# Patient Record
Sex: MALE | Race: WHITE | NOT HISPANIC OR LATINO | Employment: FULL TIME | URBAN - METROPOLITAN AREA
[De-identification: names, ages, dates, MRNs, and addresses within clinical notes are randomized per-mention and may not be internally consistent; named-entity substitution may affect disease eponyms.]

---

## 2017-03-03 ENCOUNTER — HOSPITAL ENCOUNTER (OUTPATIENT)
Dept: RADIOLOGY | Facility: MEDICAL CENTER | Age: 62
End: 2017-03-03
Attending: INTERNAL MEDICINE
Payer: COMMERCIAL

## 2017-03-03 ENCOUNTER — HOSPITAL ENCOUNTER (OUTPATIENT)
Dept: CARDIOLOGY | Facility: MEDICAL CENTER | Age: 62
End: 2017-03-03
Attending: INTERNAL MEDICINE | Admitting: RADIOLOGY
Payer: COMMERCIAL

## 2017-03-03 DIAGNOSIS — I51.7 LVH (LEFT VENTRICULAR HYPERTROPHY): ICD-10-CM

## 2017-03-03 DIAGNOSIS — Z79.01 CHRONIC ANTICOAGULATION: ICD-10-CM

## 2017-03-03 DIAGNOSIS — I26.02 ACUTE SADDLE PULMONARY EMBOLISM WITH ACUTE COR PULMONALE (HCC): ICD-10-CM

## 2017-03-03 DIAGNOSIS — I10 ESSENTIAL HYPERTENSION: ICD-10-CM

## 2017-03-03 DIAGNOSIS — I48.0 PAF (PAROXYSMAL ATRIAL FIBRILLATION) (HCC): ICD-10-CM

## 2017-03-03 PROCEDURE — 71020 DX-CHEST-2 VIEWS: CPT

## 2017-03-03 PROCEDURE — 93306 TTE W/DOPPLER COMPLETE: CPT | Mod: 26 | Performed by: INTERNAL MEDICINE

## 2017-03-03 PROCEDURE — 93306 TTE W/DOPPLER COMPLETE: CPT

## 2017-03-03 PROCEDURE — A9567 TECHNETIUM TC-99M AEROSOL: HCPCS

## 2017-03-06 LAB
LV EJECT FRACT  99904: 60
LV EJECT FRACT MOD 2C 99903: 65.45
LV EJECT FRACT MOD 4C 99902: 51.29
LV EJECT FRACT MOD BP 99901: 58.36

## 2017-03-10 ENCOUNTER — OFFICE VISIT (OUTPATIENT)
Dept: CARDIOLOGY | Facility: CLINIC | Age: 62
End: 2017-03-10
Payer: COMMERCIAL

## 2017-03-10 VITALS
HEART RATE: 74 BPM | SYSTOLIC BLOOD PRESSURE: 130 MMHG | HEIGHT: 76 IN | WEIGHT: 275 LBS | BODY MASS INDEX: 33.49 KG/M2 | DIASTOLIC BLOOD PRESSURE: 70 MMHG

## 2017-03-10 DIAGNOSIS — I26.02 ACUTE SADDLE PULMONARY EMBOLISM WITH ACUTE COR PULMONALE (HCC): ICD-10-CM

## 2017-03-10 DIAGNOSIS — I48.0 PAF (PAROXYSMAL ATRIAL FIBRILLATION) (HCC): ICD-10-CM

## 2017-03-10 DIAGNOSIS — I51.7 LVH (LEFT VENTRICULAR HYPERTROPHY): ICD-10-CM

## 2017-03-10 DIAGNOSIS — G47.34 NOCTURNAL HYPOXEMIA: ICD-10-CM

## 2017-03-10 DIAGNOSIS — L40.50 PSORIATIC ARTHRITIS (HCC): ICD-10-CM

## 2017-03-10 DIAGNOSIS — I10 ESSENTIAL HYPERTENSION: ICD-10-CM

## 2017-03-10 DIAGNOSIS — Z79.01 CHRONIC ANTICOAGULATION: ICD-10-CM

## 2017-03-10 PROCEDURE — 99214 OFFICE O/P EST MOD 30 MIN: CPT | Performed by: INTERNAL MEDICINE

## 2017-03-10 ASSESSMENT — ENCOUNTER SYMPTOMS
CARDIOVASCULAR NEGATIVE: 1
WHEEZING: 0
HEMOPTYSIS: 0
COUGH: 0
SHORTNESS OF BREATH: 0
ORTHOPNEA: 0
FEVER: 0
SPUTUM PRODUCTION: 0
BRUISES/BLEEDS EASILY: 0
PALPITATIONS: 0
CLAUDICATION: 0
GASTROINTESTINAL NEGATIVE: 1
STRIDOR: 0
LOSS OF CONSCIOUSNESS: 0
EYES NEGATIVE: 1
RESPIRATORY NEGATIVE: 1
CHILLS: 0
NEUROLOGICAL NEGATIVE: 1
DIZZINESS: 0
PND: 0
WEAKNESS: 0
SORE THROAT: 0
MUSCULOSKELETAL NEGATIVE: 1
CONSTITUTIONAL NEGATIVE: 1

## 2017-03-10 NOTE — PROGRESS NOTES
Subjective:   Octavio Jacques is a 62 y.o. male who presents today for hi symnptomatic PVCs and history of PEs.  He is still having a lot of fatigue.  His V/Q scan was normal, his echo was normal.  His PVCs still bother him,    Past Medical History   Diagnosis Date   • Arrhythmia      hx of palpations   • Snoring    • Asthma      uses rescue inhaler   • Indigestion      hx of stomachache, heart burn    • Other specified disorder of intestines      lower GI problems   • Arthritis    • Anesthesia      nausea sometimes   • History of sinus surgery      x2   • Hypertension      Past Surgical History   Procedure Laterality Date   • Egd esophagus with endoscopic us     • Fusion       left sacriloiac joint   • Cholecystectomy     • Gastroscopy-endo  7/7/2014     Performed by Dharmesh Chau M.D. at SURGERY [facility]     History reviewed. No pertinent family history.  History   Smoking status   • Never Smoker    Smokeless tobacco   • Not on file     Allergies   Allergen Reactions   • Codeine    • Lactose      GI disturbance   • Methotrexate      Outpatient Encounter Prescriptions as of 3/10/2017   Medication Sig Dispense Refill   • apixaban (ELIQUIS) 2.5mg Tab Take 1 Tab by mouth 2 Times a Day. 60 Tab 11   • diltiazem CD (CARDIZEM CD) 120 MG CAPSULE SR 24 HR Take 1 Cap by mouth every day. 30 Cap 0   • gabapentin (NEURONTIN) 300 MG Cap Take 900 mg by mouth 3 times a day.     • losartan (COZAAR) 100 MG Tab Take 100 mg by mouth every day.     • famotidine (PEPCID) 40 MG Tab Take 40 mg by mouth 2 times a day.     • montelukast (SINGULAIR) 10 MG Tab Take 10 mg by mouth every bedtime.     • tiotropium (SPIRIVA) 18 MCG Cap Inhale 18 mcg by mouth every day.     • albuterol (VENTOLIN OR PROVENTIL) 108 (90 BASE) MCG/ACT Aero Soln inhalation aerosol Inhale 2 Puffs by mouth every four hours as needed for Shortness of Breath.     • Mometasone Furo-Formoterol Fum 200-5 MCG/ACT Aerosol Inhale 2 Puffs by mouth 2 Times a Day.     •  cyclosporin (RESTASIS) 0.05 % ophthalmic emulsion Place 1 Drop in both eyes 2 times a day.     • Oxycodone-Acetaminophen (PERCOCET-10)  MG Tab Take 1-2 Tabs by mouth every 8 hours as needed for Severe Pain.     • hydrocodone-acetaminophen (NORCO) 5-325 MG Tab per tablet Take 1 Tab by mouth every 8 hours as needed. Indications: Moderate to Moderately Severe Pain     • Suvorexant 15 MG Tab Take 15 mg by mouth every bedtime. Indications: Trouble Sleeping     • vitamin D (CHOLECALCIFEROL) 1000 UNIT Tab Take 4,000 Units by mouth every day.     • ascorbic acid (ASCORBIC ACID) 500 MG Tab Take 500 mg by mouth every day.     • TRIAMCINOLONE ACETONIDE, TOP, 0.05 % Ointment 1 Application by Apply externally route every day. Indications: Psoriasis     • clobetasol (TEMOVATE) 0.05 % Ointment Apply 1 Application to affected area(s) 2 times a day.     • calcipotriene (DOVONEX) 0.005 % Cream Apply 1 g to affected area(s) 2 times a day. Indications: Extensive Chronic Plaque Psoriasis     • doxepin (ZONALON) 5 % cream Apply 1 Application to affected area(s) 3 times a day.     • rivaroxaban (XARELTO) 20 MG Tab tablet Take 1 Tab by mouth with dinner. 30 Tab 0     No facility-administered encounter medications on file as of 3/10/2017.     Review of Systems   Constitutional: Negative.  Negative for fever, chills and malaise/fatigue.   HENT: Negative.  Negative for sore throat.    Eyes: Negative.    Respiratory: Negative.  Negative for cough, hemoptysis, sputum production, shortness of breath, wheezing and stridor.    Cardiovascular: Negative.  Negative for chest pain, palpitations, orthopnea, claudication, leg swelling and PND.   Gastrointestinal: Negative.    Genitourinary: Negative.    Musculoskeletal: Negative.    Skin: Negative.    Neurological: Negative.  Negative for dizziness, loss of consciousness and weakness.   Endo/Heme/Allergies: Negative.  Does not bruise/bleed easily.   All other systems reviewed and are  "negative.       Objective:   /70 mmHg  Pulse 74  Ht 1.93 m (6' 4\")  Wt 124.739 kg (275 lb)  BMI 33.49 kg/m2    Physical Exam   Constitutional: He is oriented to person, place, and time. He appears well-developed and well-nourished. No distress.   HENT:   Head: Normocephalic.   Mouth/Throat: Oropharynx is clear and moist.   Eyes: EOM are normal. Pupils are equal, round, and reactive to light. Right eye exhibits no discharge. Left eye exhibits no discharge. No scleral icterus.   Neck: Normal range of motion. Neck supple. No JVD present. No tracheal deviation present.   Cardiovascular: Normal rate, regular rhythm, S1 normal, S2 normal, normal heart sounds, intact distal pulses and normal pulses.  Exam reveals no gallop, no S3, no S4 and no friction rub.    No murmur heard.   No systolic murmur is present    No diastolic murmur is present   Pulses:       Carotid pulses are 2+ on the right side, and 2+ on the left side.       Radial pulses are 2+ on the right side, and 2+ on the left side.        Dorsalis pedis pulses are 2+ on the right side, and 2+ on the left side.        Posterior tibial pulses are 2+ on the right side, and 2+ on the left side.   Pulmonary/Chest: Effort normal and breath sounds normal. No respiratory distress. He has no wheezes. He has no rales.   Abdominal: Soft. Bowel sounds are normal. He exhibits no distension and no mass. There is no tenderness. There is no rebound and no guarding.   Musculoskeletal: He exhibits no edema.   Neurological: He is alert and oriented to person, place, and time. No cranial nerve deficit.   Skin: Skin is warm and dry. He is not diaphoretic. No pallor.   Psychiatric: He has a normal mood and affect. His behavior is normal. Judgment and thought content normal.   Nursing note and vitals reviewed.      Assessment:     1. PAF (paroxysmal atrial fibrillation) (CMS-HCC)     2. Chronic anticoagulation     3. Acute saddle pulmonary embolism with acute cor pulmonale " (CMS-[facility])     4. Psoriatic arthritis (CMS-[facility])     5. Essential hypertension     6. Nocturnal hypoxemia     7. LVH (left ventricular hypertrophy)         Medical Decision Making:  Today's Assessment / Status / Plan:     63 y/o M with symptomatic PVCs and history of PEs.  His other testing is normal.  I will stop his dilt and switch to metop 25 BID.    Thank for you allowing me to take part in your patient's care, please call should you have any questions or would like to discuss this patient.

## 2017-03-10 NOTE — Clinical Note
[facility]   [address] [place] [address]  Phone: [phone]  Fax: [phone]              Octavio Jacques  1955    Encounter Date: 3/10/2017    Db Orr M.D.          PROGRESS NOTE:  Subjective:   Octavio Jacques is a 62 y.o. male who presents today for hi symnptomatic PVCs and history of PEs.  He is still having a lot of fatigue.  His V/Q scan was normal, his echo was normal.  His PVCs still bother him,    Past Medical History   Diagnosis Date   • Arrhythmia      hx of palpations   • Snoring    • Asthma      uses rescue inhaler   • Indigestion      hx of stomachache, heart burn    • Other specified disorder of intestines      lower GI problems   • Arthritis    • Anesthesia      nausea sometimes   • History of sinus surgery      x2   • Hypertension      Past Surgical History   Procedure Laterality Date   • Egd esophagus with endoscopic us     • Fusion       left sacriloiac joint   • Cholecystectomy     • Gastroscopy-endo  7/7/2014     Performed by Dharmesh Chau M.D. at [facility]     History reviewed. No pertinent family history.  History   Smoking status   • Never Smoker    Smokeless tobacco   • Not on file     Allergies   Allergen Reactions   • Codeine    • Lactose      GI disturbance   • Methotrexate      Outpatient Encounter Prescriptions as of 3/10/2017   Medication Sig Dispense Refill   • apixaban (ELIQUIS) 2.5mg Tab Take 1 Tab by mouth 2 Times a Day. 60 Tab 11   • diltiazem CD (CARDIZEM CD) 120 MG CAPSULE SR 24 HR Take 1 Cap by mouth every day. 30 Cap 0   • gabapentin (NEURONTIN) 300 MG Cap Take 900 mg by mouth 3 times a day.     • losartan (COZAAR) 100 MG Tab Take 100 mg by mouth every day.     • famotidine (PEPCID) 40 MG Tab Take 40 mg by mouth 2 times a day.     • montelukast (SINGULAIR) 10 MG Tab Take 10 mg by mouth every bedtime.     • tiotropium (SPIRIVA) 18 MCG Cap Inhale 18 mcg by mouth every day.      • albuterol (VENTOLIN OR PROVENTIL) 108 (90 BASE) MCG/ACT Aero Soln inhalation aerosol Inhale 2 Puffs by mouth every four hours as needed for Shortness of Breath.     • Mometasone Furo-Formoterol Fum 200-5 MCG/ACT Aerosol Inhale 2 Puffs by mouth 2 Times a Day.     • cyclosporin (RESTASIS) 0.05 % ophthalmic emulsion Place 1 Drop in both eyes 2 times a day.     • Oxycodone-Acetaminophen (PERCOCET-10)  MG Tab Take 1-2 Tabs by mouth every 8 hours as needed for Severe Pain.     • hydrocodone-acetaminophen (NORCO) 5-325 MG Tab per tablet Take 1 Tab by mouth every 8 hours as needed. Indications: Moderate to Moderately Severe Pain     • Suvorexant 15 MG Tab Take 15 mg by mouth every bedtime. Indications: Trouble Sleeping     • vitamin D (CHOLECALCIFEROL) 1000 UNIT Tab Take 4,000 Units by mouth every day.     • ascorbic acid (ASCORBIC ACID) 500 MG Tab Take 500 mg by mouth every day.     • TRIAMCINOLONE ACETONIDE, TOP, 0.05 % Ointment 1 Application by Apply externally route every day. Indications: Psoriasis     • clobetasol (TEMOVATE) 0.05 % Ointment Apply 1 Application to affected area(s) 2 times a day.     • calcipotriene (DOVONEX) 0.005 % Cream Apply 1 g to affected area(s) 2 times a day. Indications: Extensive Chronic Plaque Psoriasis     • doxepin (ZONALON) 5 % cream Apply 1 Application to affected area(s) 3 times a day.     • rivaroxaban (XARELTO) 20 MG Tab tablet Take 1 Tab by mouth with dinner. 30 Tab 0     No facility-administered encounter medications on file as of 3/10/2017.     Review of Systems   Constitutional: Negative.  Negative for fever, chills and malaise/fatigue.   HENT: Negative.  Negative for sore throat.    Eyes: Negative.    Respiratory: Negative.  Negative for cough, hemoptysis, sputum production, shortness of breath, wheezing and stridor.    Cardiovascular: Negative.  Negative for chest pain, palpitations, orthopnea, claudication, leg swelling and PND.   Gastrointestinal: Negative.     "  Genitourinary: Negative.    Musculoskeletal: Negative.    Skin: Negative.    Neurological: Negative.  Negative for dizziness, loss of consciousness and weakness.   Endo/Heme/Allergies: Negative.  Does not bruise/bleed easily.   All other systems reviewed and are negative.       Objective:   /70 mmHg  Pulse 74  Ht 1.93 m (6' 4\")  Wt 124.739 kg (275 lb)  BMI 33.49 kg/m2    Physical Exam   Constitutional: He is oriented to person, place, and time. He appears well-developed and well-nourished. No distress.   HENT:   Head: Normocephalic.   Mouth/Throat: Oropharynx is clear and moist.   Eyes: EOM are normal. Pupils are equal, round, and reactive to light. Right eye exhibits no discharge. Left eye exhibits no discharge. No scleral icterus.   Neck: Normal range of motion. Neck supple. No JVD present. No tracheal deviation present.   Cardiovascular: Normal rate, regular rhythm, S1 normal, S2 normal, normal heart sounds, intact distal pulses and normal pulses.  Exam reveals no gallop, no S3, no S4 and no friction rub.    No murmur heard.   No systolic murmur is present    No diastolic murmur is present   Pulses:       Carotid pulses are 2+ on the right side, and 2+ on the left side.       Radial pulses are 2+ on the right side, and 2+ on the left side.        Dorsalis pedis pulses are 2+ on the right side, and 2+ on the left side.        Posterior tibial pulses are 2+ on the right side, and 2+ on the left side.   Pulmonary/Chest: Effort normal and breath sounds normal. No respiratory distress. He has no wheezes. He has no rales.   Abdominal: Soft. Bowel sounds are normal. He exhibits no distension and no mass. There is no tenderness. There is no rebound and no guarding.   Musculoskeletal: He exhibits no edema.   Neurological: He is alert and oriented to person, place, and time. No cranial nerve deficit.   Skin: Skin is warm and dry. He is not diaphoretic. No pallor.   Psychiatric: He has a normal mood and affect. " His behavior is normal. Judgment and thought content normal.   Nursing note and vitals reviewed.      Assessment:     1. PAF (paroxysmal atrial fibrillation) (CMS-[facility])     2. Chronic anticoagulation     3. Acute saddle pulmonary embolism with acute cor pulmonale (CMS-[facility])     4. Psoriatic arthritis (CMS-[facility])     5. Essential hypertension     6. Nocturnal hypoxemia     7. LVH (left ventricular hypertrophy)         Medical Decision Making:  Today's Assessment / Status / Plan:     61 y/o M with symptomatic PVCs and history of PEs.  His other testing is normal.  I will stop his dilt and switch to metop 25 BID.    Thank for you allowing me to take part in your patient's care, please call should you have any questions or would like to discuss this patient.       Margot Jim M.D.  [address] [facility] [address]  VIA Facsimile: [phone]

## 2017-03-20 DIAGNOSIS — I26.02 ACUTE SADDLE PULMONARY EMBOLISM WITH ACUTE COR PULMONALE (HCC): ICD-10-CM

## 2017-03-20 DIAGNOSIS — Z79.01 CHRONIC ANTICOAGULATION: ICD-10-CM

## 2017-03-20 DIAGNOSIS — I48.0 PAF (PAROXYSMAL ATRIAL FIBRILLATION) (HCC): ICD-10-CM

## 2017-07-14 ENCOUNTER — OFFICE VISIT (OUTPATIENT)
Dept: CARDIOLOGY | Facility: CLINIC | Age: 62
End: 2017-07-14
Payer: COMMERCIAL

## 2017-07-14 VITALS
BODY MASS INDEX: 32.39 KG/M2 | HEART RATE: 57 BPM | HEIGHT: 76 IN | SYSTOLIC BLOOD PRESSURE: 110 MMHG | WEIGHT: 266 LBS | DIASTOLIC BLOOD PRESSURE: 62 MMHG | OXYGEN SATURATION: 94 %

## 2017-07-14 DIAGNOSIS — L40.50 PSORIATIC ARTHRITIS (HCC): ICD-10-CM

## 2017-07-14 DIAGNOSIS — R53.82 CHRONIC FATIGUE: ICD-10-CM

## 2017-07-14 DIAGNOSIS — Z79.01 CHRONIC ANTICOAGULATION: ICD-10-CM

## 2017-07-14 DIAGNOSIS — I10 ESSENTIAL HYPERTENSION: ICD-10-CM

## 2017-07-14 DIAGNOSIS — I48.0 PAF (PAROXYSMAL ATRIAL FIBRILLATION) (HCC): ICD-10-CM

## 2017-07-14 DIAGNOSIS — J45.20 MILD INTERMITTENT ASTHMA WITHOUT COMPLICATION: ICD-10-CM

## 2017-07-14 DIAGNOSIS — I51.7 LVH (LEFT VENTRICULAR HYPERTROPHY): ICD-10-CM

## 2017-07-14 DIAGNOSIS — I26.02 ACUTE SADDLE PULMONARY EMBOLISM WITH ACUTE COR PULMONALE (HCC): ICD-10-CM

## 2017-07-14 PROCEDURE — 99214 OFFICE O/P EST MOD 30 MIN: CPT | Performed by: INTERNAL MEDICINE

## 2017-07-14 RX ORDER — PREDNISONE 5 MG/1
TABLET ORAL
Refills: 1 | COMMUNITY
Start: 2017-06-28

## 2017-07-14 RX ORDER — METOPROLOL TARTRATE 50 MG/1
50 TABLET, FILM COATED ORAL 2 TIMES DAILY
Qty: 60 TAB | Refills: 11 | Status: SHIPPED | OUTPATIENT
Start: 2017-07-14

## 2017-07-14 ASSESSMENT — ENCOUNTER SYMPTOMS
EYES NEGATIVE: 1
COUGH: 0
PALPITATIONS: 0
CONSTITUTIONAL NEGATIVE: 1
HEMOPTYSIS: 0
CARDIOVASCULAR NEGATIVE: 1
LOSS OF CONSCIOUSNESS: 0
CHILLS: 0
SHORTNESS OF BREATH: 0
FEVER: 0
WEAKNESS: 0
NEUROLOGICAL NEGATIVE: 1
SPUTUM PRODUCTION: 0
CLAUDICATION: 0
SORE THROAT: 0
GASTROINTESTINAL NEGATIVE: 1
STRIDOR: 0
DIZZINESS: 0
MUSCULOSKELETAL NEGATIVE: 1
ORTHOPNEA: 0
BRUISES/BLEEDS EASILY: 0
RESPIRATORY NEGATIVE: 1
PND: 0
WHEEZING: 0

## 2017-07-14 NOTE — PROGRESS NOTES
Subjective:   Octavio Jacques is a 62 y.o. male who presents today For symptomatic PVCs. He continues to have symptomatic PVCs. He has a portable EKG monitor that he uses through his cell phone. He shows me rhythm strips from this today that indicated that he was in atrial fibrillation. However reviewing the strips he is in sinus rhythm with occasional PVCs. He continues to have symptoms from these. We did start him on metoprolol 25 twice per day and stopped his diltiazem but doesn't think that this helped him anyway.    Past Medical History   Diagnosis Date   • Arrhythmia      hx of palpations   • Snoring    • Asthma      uses rescue inhaler   • Indigestion      hx of stomachache, heart burn    • Other specified disorder of intestines      lower GI problems   • Arthritis    • Anesthesia      nausea sometimes   • History of sinus surgery      x2   • Hypertension      Past Surgical History   Procedure Laterality Date   • Egd esophagus with endoscopic us     • Fusion       left sacriloiac joint   • Cholecystectomy     • Gastroscopy-endo  7/7/2014     Performed by Dharmesh Chau M.D. at [facility]     History reviewed. No pertinent family history.  History   Smoking status   • Never Smoker    Smokeless tobacco   • Not on file     Allergies   Allergen Reactions   • Codeine    • Lactose      GI disturbance   • Methotrexate      Outpatient Encounter Prescriptions as of 7/14/2017   Medication Sig Dispense Refill   • predniSONE (DELTASONE) 5 MG Tab   1   • metoprolol (LOPRESSOR) 50 MG Tab Take 1 Tab by mouth 2 times a day. 60 Tab 11   • apixaban (ELIQUIS) 2.5mg Tab Take 1 Tab by mouth 2 Times a Day. 180 Tab 3   • metoprolol (LOPRESSOR) 25 MG Tab Take 1 Tab by mouth 2 times a day. 180 Tab 3   • rivaroxaban (XARELTO) 20 MG Tab tablet Take 1 Tab by mouth with dinner. 30 Tab 0   • losartan (COZAAR) 100 MG Tab Take 100 mg by mouth every day.     • famotidine (PEPCID) 40 MG Tab Take 40 mg by mouth 2 times a day.      • montelukast (SINGULAIR) 10 MG Tab Take 10 mg by mouth every bedtime.     • albuterol (VENTOLIN OR PROVENTIL) 108 (90 BASE) MCG/ACT Aero Soln inhalation aerosol Inhale 2 Puffs by mouth every four hours as needed for Shortness of Breath.     • Mometasone Furo-Formoterol Fum 200-5 MCG/ACT Aerosol Inhale 2 Puffs by mouth 2 Times a Day.     • hydrocodone-acetaminophen (NORCO) 5-325 MG Tab per tablet Take 1 Tab by mouth every 8 hours as needed. Indications: Moderate to Moderately Severe Pain     • vitamin D (CHOLECALCIFEROL) 1000 UNIT Tab Take 4,000 Units by mouth every day.     • ascorbic acid (ASCORBIC ACID) 500 MG Tab Take 500 mg by mouth every day.     • TRIAMCINOLONE ACETONIDE, TOP, 0.05 % Ointment 1 Application by Apply externally route every day. Indications: Psoriasis     • clobetasol (TEMOVATE) 0.05 % Ointment Apply 1 Application to affected area(s) 2 times a day.     • calcipotriene (DOVONEX) 0.005 % Cream Apply 1 g to affected area(s) 2 times a day. Indications: Extensive Chronic Plaque Psoriasis     • doxepin (ZONALON) 5 % cream Apply 1 Application to affected area(s) 3 times a day.     • gabapentin (NEURONTIN) 300 MG Cap Take 900 mg by mouth 3 times a day.     • tiotropium (SPIRIVA) 18 MCG Cap Inhale 18 mcg by mouth every day.     • cyclosporin (RESTASIS) 0.05 % ophthalmic emulsion Place 1 Drop in both eyes 2 times a day.     • Oxycodone-Acetaminophen (PERCOCET-10)  MG Tab Take 1-2 Tabs by mouth every 8 hours as needed for Severe Pain.     • Suvorexant 15 MG Tab Take 15 mg by mouth every bedtime. Indications: Trouble Sleeping       No facility-administered encounter medications on file as of 7/14/2017.     Review of Systems   Constitutional: Negative.  Negative for fever, chills and malaise/fatigue.   HENT: Negative.  Negative for sore throat.    Eyes: Negative.    Respiratory: Negative.  Negative for cough, hemoptysis, sputum production, shortness of breath, wheezing and stridor.   "  Cardiovascular: Negative.  Negative for chest pain, palpitations, orthopnea, claudication, leg swelling and PND.   Gastrointestinal: Negative.    Genitourinary: Negative.    Musculoskeletal: Negative.    Skin: Negative.    Neurological: Negative.  Negative for dizziness, loss of consciousness and weakness.   Endo/Heme/Allergies: Negative.  Does not bruise/bleed easily.   All other systems reviewed and are negative.       Objective:   /62 mmHg  Pulse 57  Ht 1.93 m (6' 3.98\")  Wt 120.657 kg (266 lb)  BMI 32.39 kg/m2  SpO2 94%    Physical Exam   Constitutional: He is oriented to person, place, and time. He appears well-developed and well-nourished. No distress.   HENT:   Head: Normocephalic.   Mouth/Throat: Oropharynx is clear and moist.   Eyes: EOM are normal. Pupils are equal, round, and reactive to light. Right eye exhibits no discharge. Left eye exhibits no discharge. No scleral icterus.   Neck: Normal range of motion. Neck supple. No JVD present. No tracheal deviation present.   Cardiovascular: Normal rate, regular rhythm, S1 normal, S2 normal, normal heart sounds, intact distal pulses and normal pulses.  Exam reveals no gallop, no S3, no S4 and no friction rub.    No murmur heard.   No systolic murmur is present    No diastolic murmur is present   Pulses:       Carotid pulses are 2+ on the right side, and 2+ on the left side.       Radial pulses are 2+ on the right side, and 2+ on the left side.        Dorsalis pedis pulses are 2+ on the right side, and 2+ on the left side.        Posterior tibial pulses are 2+ on the right side, and 2+ on the left side.   Pulmonary/Chest: Effort normal and breath sounds normal. No respiratory distress. He has no wheezes. He has no rales.   Abdominal: Soft. Bowel sounds are normal. He exhibits no distension and no mass. There is no tenderness. There is no rebound and no guarding.   Musculoskeletal: He exhibits no edema.   Neurological: He is alert and oriented to " person, place, and time. No cranial nerve deficit.   Skin: Skin is warm and dry. He is not diaphoretic. No pallor.   Psychiatric: He has a normal mood and affect. His behavior is normal. Judgment and thought content normal.   Nursing note and vitals reviewed.      Assessment:     1. Chronic anticoagulation     2. Chronic fatigue  metoprolol (LOPRESSOR) 50 MG Tab   3. Essential hypertension  metoprolol (LOPRESSOR) 50 MG Tab   4. LVH (left ventricular hypertrophy)     5. Mild intermittent asthma without complication     6. PAF (paroxysmal atrial fibrillation) (CMS-HCC)     7. Psoriatic arthritis (CMS-HCC)     8. Acute saddle pulmonary embolism with acute cor pulmonale (CMS-HCC)         Medical Decision Making:  Today's Assessment / Status / Plan:     62-year-old male with symptomatic PVCs. I did note a few of them on exam which she was asymptomatic from. Diabetes probably getting symptoms when they started to be sustained as well as when he goes to sleep. I will go ahead and increase his metoprolol to 50 mg twice per day. He can tolerate this during the daytime then will simply keep him on 50 at night and 20 5 in the morning.    Thank for you allowing me to take part in your patient's care, please call should you have any questions or would like to discuss this patient.

## 2017-07-14 NOTE — Clinical Note
[facility] [place]   [address] [place] [address]  Phone: [phone]  Fax: [phone]              Octavio Jacques  1955    Encounter Date: 7/14/2017    Db Orr M.D.          PROGRESS NOTE:  Subjective:   Octavio Jacques is a 62 y.o. male who presents today For symptomatic PVCs. He continues to have symptomatic PVCs. He has a portable EKG monitor that he uses through his cell phone. He shows me rhythm strips from this today that indicated that he was in atrial fibrillation. However reviewing the strips he is in sinus rhythm with occasional PVCs. He continues to have symptoms from these. We did start him on metoprolol 25 twice per day and stopped his diltiazem but doesn't think that this helped him anyway.    Past Medical History   Diagnosis Date   • Arrhythmia      hx of palpations   • Snoring    • Asthma      uses rescue inhaler   • Indigestion      hx of stomachache, heart burn    • Other specified disorder of intestines      lower GI problems   • Arthritis    • Anesthesia      nausea sometimes   • History of sinus surgery      x2   • Hypertension      Past Surgical History   Procedure Laterality Date   • Egd esophagus with endoscopic us     • Fusion       left sacriloiac joint   • Cholecystectomy     • Gastroscopy-endo  7/7/2014     Performed by Dharmesh Chau M.D. at [facility]     History reviewed. No pertinent family history.  History   Smoking status   • Never Smoker    Smokeless tobacco   • Not on file     Allergies   Allergen Reactions   • Codeine    • Lactose      GI disturbance   • Methotrexate      Outpatient Encounter Prescriptions as of 7/14/2017   Medication Sig Dispense Refill   • predniSONE (DELTASONE) 5 MG Tab   1   • metoprolol (LOPRESSOR) 50 MG Tab Take 1 Tab by mouth 2 times a day. 60 Tab 11   • apixaban (ELIQUIS) 2.5mg Tab Take 1 Tab by mouth 2 Times a Day. 180 Tab 3   • metoprolol (LOPRESSOR) 25 MG  Tab Take 1 Tab by mouth 2 times a day. 180 Tab 3   • rivaroxaban (XARELTO) 20 MG Tab tablet Take 1 Tab by mouth with dinner. 30 Tab 0   • losartan (COZAAR) 100 MG Tab Take 100 mg by mouth every day.     • famotidine (PEPCID) 40 MG Tab Take 40 mg by mouth 2 times a day.     • montelukast (SINGULAIR) 10 MG Tab Take 10 mg by mouth every bedtime.     • albuterol (VENTOLIN OR PROVENTIL) 108 (90 BASE) MCG/ACT Aero Soln inhalation aerosol Inhale 2 Puffs by mouth every four hours as needed for Shortness of Breath.     • Mometasone Furo-Formoterol Fum 200-5 MCG/ACT Aerosol Inhale 2 Puffs by mouth 2 Times a Day.     • hydrocodone-acetaminophen (NORCO) 5-325 MG Tab per tablet Take 1 Tab by mouth every 8 hours as needed. Indications: Moderate to Moderately Severe Pain     • vitamin D (CHOLECALCIFEROL) 1000 UNIT Tab Take 4,000 Units by mouth every day.     • ascorbic acid (ASCORBIC ACID) 500 MG Tab Take 500 mg by mouth every day.     • TRIAMCINOLONE ACETONIDE, TOP, 0.05 % Ointment 1 Application by Apply externally route every day. Indications: Psoriasis     • clobetasol (TEMOVATE) 0.05 % Ointment Apply 1 Application to affected area(s) 2 times a day.     • calcipotriene (DOVONEX) 0.005 % Cream Apply 1 g to affected area(s) 2 times a day. Indications: Extensive Chronic Plaque Psoriasis     • doxepin (ZONALON) 5 % cream Apply 1 Application to affected area(s) 3 times a day.     • gabapentin (NEURONTIN) 300 MG Cap Take 900 mg by mouth 3 times a day.     • tiotropium (SPIRIVA) 18 MCG Cap Inhale 18 mcg by mouth every day.     • cyclosporin (RESTASIS) 0.05 % ophthalmic emulsion Place 1 Drop in both eyes 2 times a day.     • Oxycodone-Acetaminophen (PERCOCET-10)  MG Tab Take 1-2 Tabs by mouth every 8 hours as needed for Severe Pain.     • Suvorexant 15 MG Tab Take 15 mg by mouth every bedtime. Indications: Trouble Sleeping       No facility-administered encounter medications on file as of 7/14/2017.     Review of Systems    "  Constitutional: Negative.  Negative for fever, chills and malaise/fatigue.   HENT: Negative.  Negative for sore throat.    Eyes: Negative.    Respiratory: Negative.  Negative for cough, hemoptysis, sputum production, shortness of breath, wheezing and stridor.    Cardiovascular: Negative.  Negative for chest pain, palpitations, orthopnea, claudication, leg swelling and PND.   Gastrointestinal: Negative.    Genitourinary: Negative.    Musculoskeletal: Negative.    Skin: Negative.    Neurological: Negative.  Negative for dizziness, loss of consciousness and weakness.   Endo/Heme/Allergies: Negative.  Does not bruise/bleed easily.   All other systems reviewed and are negative.       Objective:   /62 mmHg  Pulse 57  Ht 1.93 m (6' 3.98\")  Wt 120.657 kg (266 lb)  BMI 32.39 kg/m2  SpO2 94%    Physical Exam   Constitutional: He is oriented to person, place, and time. He appears well-developed and well-nourished. No distress.   HENT:   Head: Normocephalic.   Mouth/Throat: Oropharynx is clear and moist.   Eyes: EOM are normal. Pupils are equal, round, and reactive to light. Right eye exhibits no discharge. Left eye exhibits no discharge. No scleral icterus.   Neck: Normal range of motion. Neck supple. No JVD present. No tracheal deviation present.   Cardiovascular: Normal rate, regular rhythm, S1 normal, S2 normal, normal heart sounds, intact distal pulses and normal pulses.  Exam reveals no gallop, no S3, no S4 and no friction rub.    No murmur heard.   No systolic murmur is present    No diastolic murmur is present   Pulses:       Carotid pulses are 2+ on the right side, and 2+ on the left side.       Radial pulses are 2+ on the right side, and 2+ on the left side.        Dorsalis pedis pulses are 2+ on the right side, and 2+ on the left side.        Posterior tibial pulses are 2+ on the right side, and 2+ on the left side.   Pulmonary/Chest: Effort normal and breath sounds normal. No respiratory distress. He " has no wheezes. He has no rales.   Abdominal: Soft. Bowel sounds are normal. He exhibits no distension and no mass. There is no tenderness. There is no rebound and no guarding.   Musculoskeletal: He exhibits no edema.   Neurological: He is alert and oriented to person, place, and time. No cranial nerve deficit.   Skin: Skin is warm and dry. He is not diaphoretic. No pallor.   Psychiatric: He has a normal mood and affect. His behavior is normal. Judgment and thought content normal.   Nursing note and vitals reviewed.      Assessment:     1. Chronic anticoagulation     2. Chronic fatigue  metoprolol (LOPRESSOR) 50 MG Tab   3. Essential hypertension  metoprolol (LOPRESSOR) 50 MG Tab   4. LVH (left ventricular hypertrophy)     5. Mild intermittent asthma without complication     6. PAF (paroxysmal atrial fibrillation) (CMS-HCC)     7. Psoriatic arthritis (CMS-HCC)     8. Acute saddle pulmonary embolism with acute cor pulmonale (CMS-HCC)         Medical Decision Making:  Today's Assessment / Status / Plan:     62-year-old male with symptomatic PVCs. I did note a few of them on exam which she was asymptomatic from. Diabetes probably getting symptoms when they started to be sustained as well as when he goes to sleep. I will go ahead and increase his metoprolol to 50 mg twice per day. He can tolerate this during the daytime then will simply keep him on 50 at night and 20 5 in the morning.    Thank for you allowing me to take part in your patient's care, please call should you have any questions or would like to discuss this patient.      Margot Jim M.D.  [address] [facility] [address]  VIA Facsimile: [phone]

## 2018-08-27 DIAGNOSIS — Z79.01 CHRONIC ANTICOAGULATION: ICD-10-CM

## 2018-08-27 DIAGNOSIS — I26.02 ACUTE SADDLE PULMONARY EMBOLISM WITH ACUTE COR PULMONALE (HCC): ICD-10-CM

## 2018-08-27 DIAGNOSIS — I48.0 PAF (PAROXYSMAL ATRIAL FIBRILLATION) (HCC): ICD-10-CM

## 2018-09-14 ENCOUNTER — TELEPHONE (OUTPATIENT)
Dept: CARDIOLOGY | Facility: MEDICAL CENTER | Age: 63
End: 2018-09-14

## 2018-09-14 NOTE — TELEPHONE ENCOUNTER
PT was contacted twice and messages were left asking pt to CB and sched. No call back received, letter sent to patient.